# Patient Record
Sex: FEMALE | Race: WHITE | NOT HISPANIC OR LATINO | Employment: OTHER | URBAN - METROPOLITAN AREA
[De-identification: names, ages, dates, MRNs, and addresses within clinical notes are randomized per-mention and may not be internally consistent; named-entity substitution may affect disease eponyms.]

---

## 2020-07-06 ENCOUNTER — OFFICE VISIT (OUTPATIENT)
Dept: URBAN - METROPOLITAN AREA CLINIC 68 | Facility: CLINIC | Age: 85
End: 2020-07-06

## 2020-07-07 ENCOUNTER — OFFICE VISIT (OUTPATIENT)
Dept: URBAN - METROPOLITAN AREA CLINIC 68 | Facility: CLINIC | Age: 85
End: 2020-07-07

## 2020-07-16 ENCOUNTER — OFFICE VISIT (OUTPATIENT)
Dept: URBAN - METROPOLITAN AREA SURGERY CENTER 12 | Facility: SURGERY CENTER | Age: 85
End: 2020-07-16

## 2020-07-17 ENCOUNTER — LAB OUTSIDE AN ENCOUNTER (OUTPATIENT)
Dept: URBAN - METROPOLITAN AREA CLINIC 68 | Facility: CLINIC | Age: 85
End: 2020-07-17

## 2020-07-17 LAB — 01: (no result)

## 2020-07-29 ENCOUNTER — TELEPHONE ENCOUNTER (OUTPATIENT)
Dept: URBAN - METROPOLITAN AREA CLINIC 68 | Facility: CLINIC | Age: 85
End: 2020-07-29

## 2020-07-29 ENCOUNTER — OFFICE VISIT (OUTPATIENT)
Dept: URBAN - METROPOLITAN AREA CLINIC 68 | Facility: CLINIC | Age: 85
End: 2020-07-29

## 2020-08-18 ENCOUNTER — TELEPHONE ENCOUNTER (OUTPATIENT)
Dept: URBAN - METROPOLITAN AREA CLINIC 68 | Facility: CLINIC | Age: 85
End: 2020-08-18

## 2020-08-19 ENCOUNTER — OFFICE VISIT (OUTPATIENT)
Dept: URBAN - METROPOLITAN AREA SURGERY CENTER 12 | Facility: SURGERY CENTER | Age: 85
End: 2020-08-19

## 2020-10-02 ENCOUNTER — OFFICE VISIT (OUTPATIENT)
Dept: URBAN - METROPOLITAN AREA CLINIC 68 | Facility: CLINIC | Age: 85
End: 2020-10-02

## 2021-01-15 ENCOUNTER — OFFICE VISIT (OUTPATIENT)
Dept: URBAN - METROPOLITAN AREA CLINIC 68 | Facility: CLINIC | Age: 86
End: 2021-01-15

## 2021-02-16 ENCOUNTER — OFFICE VISIT (OUTPATIENT)
Dept: URBAN - METROPOLITAN AREA CLINIC 68 | Facility: CLINIC | Age: 86
End: 2021-02-16

## 2021-05-27 ENCOUNTER — OFFICE VISIT (OUTPATIENT)
Dept: URBAN - METROPOLITAN AREA CLINIC 68 | Facility: CLINIC | Age: 86
End: 2021-05-27

## 2021-07-22 ENCOUNTER — OFFICE VISIT (OUTPATIENT)
Dept: URBAN - METROPOLITAN AREA CLINIC 68 | Facility: CLINIC | Age: 86
End: 2021-07-22

## 2021-08-17 ENCOUNTER — OFFICE VISIT (OUTPATIENT)
Dept: URBAN - METROPOLITAN AREA CLINIC 68 | Facility: CLINIC | Age: 86
End: 2021-08-17

## 2022-01-14 ENCOUNTER — OFFICE VISIT (OUTPATIENT)
Dept: URBAN - METROPOLITAN AREA CLINIC 68 | Facility: CLINIC | Age: 87
End: 2022-01-14

## 2022-01-19 ENCOUNTER — OFFICE VISIT (OUTPATIENT)
Dept: URBAN - METROPOLITAN AREA SURGERY CENTER 12 | Facility: SURGERY CENTER | Age: 87
End: 2022-01-19

## 2022-01-20 ENCOUNTER — LAB OUTSIDE AN ENCOUNTER (OUTPATIENT)
Dept: URBAN - METROPOLITAN AREA CLINIC 68 | Facility: CLINIC | Age: 87
End: 2022-01-20

## 2022-01-20 LAB — 01: (no result)

## 2022-04-20 ENCOUNTER — OFFICE VISIT (OUTPATIENT)
Dept: URBAN - METROPOLITAN AREA CLINIC 68 | Facility: CLINIC | Age: 87
End: 2022-04-20

## 2022-05-05 ENCOUNTER — OFFICE VISIT (OUTPATIENT)
Dept: URBAN - METROPOLITAN AREA CLINIC 68 | Facility: CLINIC | Age: 87
End: 2022-05-05

## 2022-05-19 ENCOUNTER — OFFICE VISIT (OUTPATIENT)
Dept: URBAN - METROPOLITAN AREA CLINIC 68 | Facility: CLINIC | Age: 87
End: 2022-05-19

## 2022-06-04 ENCOUNTER — TELEPHONE ENCOUNTER (OUTPATIENT)
Dept: URBAN - METROPOLITAN AREA CLINIC 68 | Facility: CLINIC | Age: 87
End: 2022-06-04

## 2022-06-04 RX ORDER — FAMOTIDINE 40 MG/1
FAMOTIDINE( 40MG ORAL  DAILY ) INACTIVE -HX ENTRY TABLET ORAL DAILY
OUTPATIENT
Start: 2020-07-07

## 2022-06-04 RX ORDER — POLYETHYLENE GLYCOL 3350 17 G/DOSE
POWDER (GRAM) ORAL
Qty: 1 | Refills: 0 | OUTPATIENT
Start: 2018-01-03 | End: 2018-02-02

## 2022-06-04 RX ORDER — PRAVASTATIN SODIUM 40 MG/1
PRAVASTATIN SODIUM( 40MG ORAL  DAILY ) INACTIVE -HX ENTRY TABLET ORAL DAILY
OUTPATIENT
Start: 2020-07-07

## 2022-06-04 RX ORDER — PANTOPRAZOLE SODIUM 40 MG/1
TABLET, DELAYED RELEASE ORAL DAILY
Qty: 90 | Refills: 0 | OUTPATIENT
Start: 2020-07-29 | End: 2020-10-27

## 2022-06-04 RX ORDER — AMLODIPINE BESYLATE 5 MG/1
AMLODIPINE BESYLATE( 5MG ORAL  DAILY ) INACTIVE -HX ENTRY TABLET ORAL DAILY
OUTPATIENT
Start: 2020-07-07

## 2022-06-04 RX ORDER — CLOPIDOGREL 75 MG/1
CLOPIDOGREL BISULFATE( 75MG ORAL  DAILY ) INACTIVE -HX ENTRY TABLET ORAL DAILY
OUTPATIENT
Start: 2020-07-07

## 2022-06-05 ENCOUNTER — TELEPHONE ENCOUNTER (OUTPATIENT)
Dept: URBAN - METROPOLITAN AREA CLINIC 68 | Facility: CLINIC | Age: 87
End: 2022-06-05

## 2022-06-05 RX ORDER — CHLORHEXIDINE GLUCONATE 4 %
MULTIVITAMIN ADULTS(  ORAL  DAILY ) ACTIVE -HX ENTRY LIQUID (ML) TOPICAL DAILY
Status: ACTIVE | COMMUNITY
Start: 2022-01-14

## 2022-06-05 RX ORDER — ATORVASTATIN CALCIUM 10 MG/1
ATORVASTATIN CALCIUM( 10MG ORAL 1 DAILY ) ACTIVE -HX ENTRY TABLET, FILM COATED ORAL DAILY
Status: ACTIVE | COMMUNITY
Start: 2022-01-14

## 2022-06-05 RX ORDER — CARVEDILOL 25 MG/1
CARVEDILOL( 25MG ORAL  DAILY ) ACTIVE -HX ENTRY TABLET, FILM COATED ORAL DAILY
Status: ACTIVE | COMMUNITY
Start: 2022-01-14

## 2022-06-05 RX ORDER — LISINOPRIL 20 MG/1
LISINOPRIL( 20MG ORAL  DAILY ) ACTIVE -HX ENTRY TABLET ORAL DAILY
Status: ACTIVE | COMMUNITY
Start: 2022-01-14

## 2022-06-05 RX ORDER — HYDROCORTISONE ACETATE 0.5 %
GLUCOSAMINE CHONDR 1500 COMPLX(  ORAL  DAILY ) ACTIVE -HX ENTRY CREAM (GRAM) TOPICAL DAILY
Status: ACTIVE | COMMUNITY
Start: 2022-01-14

## 2022-06-05 RX ORDER — ALENDRONATE SODIUM 70 MG/1
ALENDRONATE SODIUM( 70MG ORAL  WEEKLY ) ACTIVE -HX ENTRY TABLET ORAL WEEKLY
Status: ACTIVE | COMMUNITY
Start: 2022-01-14

## 2022-06-25 ENCOUNTER — TELEPHONE ENCOUNTER (OUTPATIENT)
Age: 87
End: 2022-06-25

## 2022-06-25 RX ORDER — LORATADINE 5 MG
TABLET,CHEWABLE ORAL
Qty: 1 | Refills: 0 | OUTPATIENT
Start: 2018-01-03 | End: 2018-02-02

## 2022-06-25 RX ORDER — AMLODIPINE BESYLATE 5 MG/1
AMLODIPINE BESYLATE( 5MG ORAL  DAILY ) INACTIVE -HX ENTRY TABLET ORAL DAILY
OUTPATIENT
Start: 2020-07-07

## 2022-06-25 RX ORDER — PANTOPRAZOLE 40 MG/1
TABLET, DELAYED RELEASE ORAL DAILY
Qty: 90 | Refills: 0 | OUTPATIENT
Start: 2020-07-29 | End: 2020-10-27

## 2022-06-25 RX ORDER — CLOPIDOGREL BISULFATE 75 MG/1
CLOPIDOGREL BISULFATE( 75MG ORAL  DAILY ) INACTIVE -HX ENTRY TABLET, FILM COATED ORAL DAILY
OUTPATIENT
Start: 2020-07-07

## 2022-06-25 RX ORDER — FAMOTIDINE 40 MG/1
FAMOTIDINE( 40MG ORAL  DAILY ) INACTIVE -HX ENTRY TABLET, FILM COATED ORAL DAILY
OUTPATIENT
Start: 2020-07-07

## 2022-06-25 RX ORDER — PRAVASTATIN SODIUM 40 MG/1
PRAVASTATIN SODIUM( 40MG ORAL  DAILY ) INACTIVE -HX ENTRY TABLET ORAL DAILY
OUTPATIENT
Start: 2020-07-07

## 2022-06-26 ENCOUNTER — TELEPHONE ENCOUNTER (OUTPATIENT)
Age: 87
End: 2022-06-26

## 2022-06-26 RX ORDER — LISINOPRIL 20 MG/1
LISINOPRIL( 20MG ORAL  DAILY ) ACTIVE -HX ENTRY TABLET ORAL DAILY
Status: ACTIVE | COMMUNITY
Start: 2022-05-19

## 2022-06-26 RX ORDER — CHROMIUM 200 MCG
VIT D3(    DAILY ) ACTIVE -HX ENTRY TABLET ORAL DAILY
Status: ACTIVE | COMMUNITY
Start: 2022-05-19

## 2022-06-26 RX ORDER — CALCIUM CARBONATE/VITAMIN D3 600 MG-10
CALCIUM (    DAILY ) ACTIVE -HX ENTRY TABLET ORAL DAILY
Status: ACTIVE | COMMUNITY
Start: 2022-05-19

## 2022-06-26 RX ORDER — ALENDRONATE SODIUM 70 MG/1
ALENDRONATE SODIUM( 70MG ORAL  WEEKLY ) ACTIVE -HX ENTRY TABLET ORAL WEEKLY
Status: ACTIVE | COMMUNITY
Start: 2022-05-19

## 2022-06-26 RX ORDER — ATORVASTATIN CALCIUM 10 MG/1
ATORVASTATIN CALCIUM( 10MG ORAL 1 DAILY ) ACTIVE -HX ENTRY TABLET, FILM COATED ORAL DAILY
Status: ACTIVE | COMMUNITY
Start: 2022-05-19

## 2022-06-26 RX ORDER — OMEGA-3/DHA/EPA/FISH OIL 1000 MG
FISH OIL( 1000MG ORAL  DAILY ) ACTIVE -HX ENTRY CAPSULE ORAL DAILY
Status: ACTIVE | COMMUNITY
Start: 2022-05-19

## 2022-06-26 RX ORDER — CARVEDILOL 25 MG/1
CARVEDILOL( 25MG ORAL  DAILY ) ACTIVE -HX ENTRY TABLET, FILM COATED ORAL DAILY
Status: ACTIVE | COMMUNITY
Start: 2022-05-19

## 2022-06-26 RX ORDER — FLUPHENAZINE HCL 1 MG
GLUCOSAMINE CHONDR 1500 COMPLX(  ORAL  DAILY ) ACTIVE -HX ENTRY TABLET ORAL DAILY
Status: ACTIVE | COMMUNITY
Start: 2022-05-19

## 2022-06-26 RX ORDER — ASPIRIN 81 MG/1
LOW-DOSE ASPIRIN( 81MG ORAL 1 DAILY ) ACTIVE -HX ENTRY TABLET, COATED ORAL DAILY
Status: ACTIVE | COMMUNITY
Start: 2022-05-19

## 2022-06-26 RX ORDER — CHLORHEXIDINE GLUCONATE 4 %
MULTIVITAMIN ADULTS(  ORAL  DAILY ) ACTIVE -HX ENTRY LIQUID (ML) TOPICAL DAILY
Status: ACTIVE | COMMUNITY
Start: 2022-05-19

## 2022-06-30 ENCOUNTER — OFFICE VISIT (OUTPATIENT)
Dept: URBAN - METROPOLITAN AREA CLINIC 68 | Facility: CLINIC | Age: 87
End: 2022-06-30

## 2022-07-18 ENCOUNTER — OFFICE VISIT (OUTPATIENT)
Dept: URBAN - METROPOLITAN AREA CLINIC 68 | Facility: CLINIC | Age: 87
End: 2022-07-18

## 2023-08-28 ENCOUNTER — LAB OUTSIDE AN ENCOUNTER (OUTPATIENT)
Dept: URBAN - METROPOLITAN AREA CLINIC 68 | Facility: CLINIC | Age: 88
End: 2023-08-28

## 2023-08-28 ENCOUNTER — OFFICE VISIT (OUTPATIENT)
Dept: URBAN - METROPOLITAN AREA CLINIC 68 | Facility: CLINIC | Age: 88
End: 2023-08-28
Payer: MEDICARE

## 2023-08-28 ENCOUNTER — WEB ENCOUNTER (OUTPATIENT)
Dept: URBAN - METROPOLITAN AREA CLINIC 68 | Facility: CLINIC | Age: 88
End: 2023-08-28

## 2023-08-28 VITALS
HEIGHT: 66 IN | HEART RATE: 66 BPM | SYSTOLIC BLOOD PRESSURE: 122 MMHG | BODY MASS INDEX: 24.11 KG/M2 | DIASTOLIC BLOOD PRESSURE: 74 MMHG | OXYGEN SATURATION: 97 % | WEIGHT: 150 LBS

## 2023-08-28 DIAGNOSIS — K29.30 CHRONIC SUPERFICIAL GASTRITIS WITHOUT BLEEDING: ICD-10-CM

## 2023-08-28 DIAGNOSIS — K57.30 COLON, DIVERTICULOSIS: ICD-10-CM

## 2023-08-28 DIAGNOSIS — Q39.6 ESOPHAGEAL DIVERTICULUM: ICD-10-CM

## 2023-08-28 DIAGNOSIS — R13.19 ESOPHAGEAL DYSPHAGIA: ICD-10-CM

## 2023-08-28 DIAGNOSIS — K59.09 CHRONIC CONSTIPATION: ICD-10-CM

## 2023-08-28 PROBLEM — 40890009: Status: ACTIVE | Noted: 2023-08-28

## 2023-08-28 PROCEDURE — 99214 OFFICE O/P EST MOD 30 MIN: CPT | Performed by: INTERNAL MEDICINE

## 2023-08-28 RX ORDER — ATORVASTATIN CALCIUM 10 MG/1
ATORVASTATIN CALCIUM( 10MG ORAL 1 DAILY ) ACTIVE -HX ENTRY TABLET, FILM COATED ORAL DAILY
Status: ACTIVE | COMMUNITY
Start: 2022-05-19

## 2023-08-28 RX ORDER — ASPIRIN 81 MG/1
LOW-DOSE ASPIRIN( 81MG ORAL 1 DAILY ) ACTIVE -HX ENTRY TABLET, COATED ORAL DAILY
Status: ON HOLD | COMMUNITY
Start: 2022-05-19

## 2023-08-28 RX ORDER — CALCIUM CARBONATE/VITAMIN D3 600 MG-10
CALCIUM (    DAILY ) ACTIVE -HX ENTRY TABLET ORAL DAILY
Status: ACTIVE | COMMUNITY
Start: 2022-05-19

## 2023-08-28 RX ORDER — CHLORHEXIDINE GLUCONATE 4 %
MULTIVITAMIN ADULTS(  ORAL  DAILY ) ACTIVE -HX ENTRY LIQUID (ML) TOPICAL DAILY
Status: ACTIVE | COMMUNITY
Start: 2022-05-19

## 2023-08-28 RX ORDER — LISINOPRIL 20 MG/1
LISINOPRIL( 20MG ORAL  DAILY ) ACTIVE -HX ENTRY TABLET ORAL DAILY
Status: ACTIVE | COMMUNITY
Start: 2022-05-19

## 2023-08-28 RX ORDER — ALENDRONATE SODIUM 70 MG/1
ALENDRONATE SODIUM( 70MG ORAL  WEEKLY ) ACTIVE -HX ENTRY TABLET ORAL WEEKLY
Status: ACTIVE | COMMUNITY
Start: 2022-05-19

## 2023-08-28 RX ORDER — OMEGA-3/DHA/EPA/FISH OIL 1000 MG
FISH OIL( 1000MG ORAL  DAILY ) ACTIVE -HX ENTRY CAPSULE ORAL DAILY
Status: ACTIVE | COMMUNITY
Start: 2022-05-19

## 2023-08-28 RX ORDER — PANTOPRAZOLE SODIUM 40 MG/1
1 TABLET TABLET, DELAYED RELEASE ORAL ONCE A DAY
Status: ACTIVE | COMMUNITY

## 2023-08-28 RX ORDER — CARVEDILOL 25 MG/1
CARVEDILOL( 25MG ORAL  DAILY ) ACTIVE -HX ENTRY TABLET, FILM COATED ORAL DAILY
Status: ACTIVE | COMMUNITY
Start: 2022-05-19

## 2023-08-28 RX ORDER — FLUPHENAZINE HCL 1 MG
GLUCOSAMINE CHONDR 1500 COMPLX(  ORAL  DAILY ) ACTIVE -HX ENTRY TABLET ORAL DAILY
Status: ON HOLD | COMMUNITY
Start: 2022-05-19

## 2023-08-28 RX ORDER — CHROMIUM 200 MCG
VIT D3(    DAILY ) ACTIVE -HX ENTRY TABLET ORAL DAILY
Status: ON HOLD | COMMUNITY
Start: 2022-05-19

## 2023-08-28 NOTE — HPI-TODAY'S VISIT:
Patient evaluated due to dysphagia. Referred having intermittent episodes of dysphagia mostly with solids. Patient with hx of esophageal stricture and esophageal diverticulum.  Denied nausea, vomiting or diarrhea.

## 2023-08-31 ENCOUNTER — OFFICE VISIT (OUTPATIENT)
Dept: URBAN - METROPOLITAN AREA SURGERY CENTER 12 | Facility: SURGERY CENTER | Age: 88
End: 2023-08-31
Payer: MEDICARE

## 2023-08-31 ENCOUNTER — CLAIMS CREATED FROM THE CLAIM WINDOW (OUTPATIENT)
Dept: URBAN - METROPOLITAN AREA CLINIC 4 | Facility: CLINIC | Age: 88
End: 2023-08-31
Payer: MEDICARE

## 2023-08-31 ENCOUNTER — DASHBOARD ENCOUNTERS (OUTPATIENT)
Age: 88
End: 2023-08-31

## 2023-08-31 DIAGNOSIS — K22.89 OTHER SPECIFIED DISEASE OF ESOPHAGUS: ICD-10-CM

## 2023-08-31 DIAGNOSIS — K29.70 GASTRITIS WITHOUT BLEEDING, UNSPECIFIED CHRONICITY, UNSPECIFIED GASTRITIS TYPE: ICD-10-CM

## 2023-08-31 DIAGNOSIS — K22.2 ESOPHAGEAL STRICTURE: ICD-10-CM

## 2023-08-31 DIAGNOSIS — K29.70 GASTRITIS, UNSPECIFIED, WITHOUT BLEEDING: ICD-10-CM

## 2023-08-31 DIAGNOSIS — K22.2 ESOPHAGEAL STENOSIS: ICD-10-CM

## 2023-08-31 PROBLEM — 4556007: Status: ACTIVE | Noted: 2023-08-31

## 2023-08-31 PROCEDURE — 00731 ANES UPR GI NDSC PX NOS: CPT | Performed by: NURSE ANESTHETIST, CERTIFIED REGISTERED

## 2023-08-31 PROCEDURE — 43248 EGD GUIDE WIRE INSERTION: CPT | Performed by: INTERNAL MEDICINE

## 2023-08-31 PROCEDURE — 43239 EGD BIOPSY SINGLE/MULTIPLE: CPT | Performed by: INTERNAL MEDICINE

## 2023-08-31 PROCEDURE — 88312 SPECIAL STAINS GROUP 1: CPT | Performed by: PATHOLOGY

## 2023-08-31 PROCEDURE — 43248 EGD GUIDE WIRE INSERTION: CPT | Performed by: CLINIC/CENTER

## 2023-08-31 PROCEDURE — 88305 TISSUE EXAM BY PATHOLOGIST: CPT | Performed by: PATHOLOGY

## 2023-08-31 PROCEDURE — 43239 EGD BIOPSY SINGLE/MULTIPLE: CPT | Performed by: CLINIC/CENTER

## 2023-08-31 RX ORDER — CALCIUM CARBONATE/VITAMIN D3 600 MG-10
CALCIUM (    DAILY ) ACTIVE -HX ENTRY TABLET ORAL DAILY
Status: ACTIVE | COMMUNITY
Start: 2022-05-19

## 2023-08-31 RX ORDER — ALENDRONATE SODIUM 70 MG/1
ALENDRONATE SODIUM( 70MG ORAL  WEEKLY ) ACTIVE -HX ENTRY TABLET ORAL WEEKLY
Status: ACTIVE | COMMUNITY
Start: 2022-05-19

## 2023-08-31 RX ORDER — CHROMIUM 200 MCG
VIT D3(    DAILY ) ACTIVE -HX ENTRY TABLET ORAL DAILY
Status: ON HOLD | COMMUNITY
Start: 2022-05-19

## 2023-08-31 RX ORDER — ASPIRIN 81 MG/1
LOW-DOSE ASPIRIN( 81MG ORAL 1 DAILY ) ACTIVE -HX ENTRY TABLET, COATED ORAL DAILY
Status: ON HOLD | COMMUNITY
Start: 2022-05-19

## 2023-08-31 RX ORDER — LISINOPRIL 20 MG/1
LISINOPRIL( 20MG ORAL  DAILY ) ACTIVE -HX ENTRY TABLET ORAL DAILY
Status: ACTIVE | COMMUNITY
Start: 2022-05-19

## 2023-08-31 RX ORDER — FLUPHENAZINE HCL 1 MG
GLUCOSAMINE CHONDR 1500 COMPLX(  ORAL  DAILY ) ACTIVE -HX ENTRY TABLET ORAL DAILY
Status: ON HOLD | COMMUNITY
Start: 2022-05-19

## 2023-08-31 RX ORDER — ATORVASTATIN CALCIUM 10 MG/1
ATORVASTATIN CALCIUM( 10MG ORAL 1 DAILY ) ACTIVE -HX ENTRY TABLET, FILM COATED ORAL DAILY
Status: ACTIVE | COMMUNITY
Start: 2022-05-19

## 2023-08-31 RX ORDER — CHLORHEXIDINE GLUCONATE 4 %
MULTIVITAMIN ADULTS(  ORAL  DAILY ) ACTIVE -HX ENTRY LIQUID (ML) TOPICAL DAILY
Status: ACTIVE | COMMUNITY
Start: 2022-05-19

## 2023-08-31 RX ORDER — OMEGA-3/DHA/EPA/FISH OIL 1000 MG
FISH OIL( 1000MG ORAL  DAILY ) ACTIVE -HX ENTRY CAPSULE ORAL DAILY
Status: ACTIVE | COMMUNITY
Start: 2022-05-19

## 2023-08-31 RX ORDER — CARVEDILOL 25 MG/1
CARVEDILOL( 25MG ORAL  DAILY ) ACTIVE -HX ENTRY TABLET, FILM COATED ORAL DAILY
Status: ACTIVE | COMMUNITY
Start: 2022-05-19

## 2023-08-31 RX ORDER — PANTOPRAZOLE SODIUM 40 MG/1
1 TABLET TABLET, DELAYED RELEASE ORAL ONCE A DAY
Status: ACTIVE | COMMUNITY

## 2025-05-07 ENCOUNTER — OFFICE VISIT (OUTPATIENT)
Dept: URBAN - METROPOLITAN AREA CLINIC 68 | Facility: CLINIC | Age: 89
End: 2025-05-07
Payer: MEDICARE

## 2025-05-07 ENCOUNTER — LAB OUTSIDE AN ENCOUNTER (OUTPATIENT)
Dept: URBAN - METROPOLITAN AREA CLINIC 68 | Facility: CLINIC | Age: 89
End: 2025-05-07

## 2025-05-07 DIAGNOSIS — R13.19 ESOPHAGEAL DYSPHAGIA: ICD-10-CM

## 2025-05-07 DIAGNOSIS — K29.30 CHRONIC SUPERFICIAL GASTRITIS WITHOUT BLEEDING: ICD-10-CM

## 2025-05-07 DIAGNOSIS — K62.5 RECTAL BLEED: ICD-10-CM

## 2025-05-07 DIAGNOSIS — K59.09 CHRONIC CONSTIPATION: ICD-10-CM

## 2025-05-07 DIAGNOSIS — Z12.11 SCREENING FOR COLON CANCER: ICD-10-CM

## 2025-05-07 PROBLEM — 12063002: Status: ACTIVE | Noted: 2025-05-07

## 2025-05-07 PROCEDURE — 99214 OFFICE O/P EST MOD 30 MIN: CPT | Performed by: INTERNAL MEDICINE

## 2025-05-07 RX ORDER — CHROMIUM 200 MCG
VIT D3(    DAILY ) ACTIVE -HX ENTRY TABLET ORAL DAILY
Status: ON HOLD | COMMUNITY
Start: 2022-05-19

## 2025-05-07 RX ORDER — NAPROXEN SODIUM 220 MG
ALEVE( 220MG ORAL  DAILY ) ACTIVE -HX ENTRY CAPSULE ORAL DAILY
Status: ACTIVE | COMMUNITY
Start: 2022-05-19

## 2025-05-07 RX ORDER — ASPIRIN 81 MG/1
LOW-DOSE ASPIRIN( 81MG ORAL 1 DAILY ) ACTIVE -HX ENTRY TABLET, COATED ORAL DAILY
Status: ON HOLD | COMMUNITY
Start: 2022-05-19

## 2025-05-07 RX ORDER — OMEGA-3/DHA/EPA/FISH OIL 1000 MG
FISH OIL( 1000MG ORAL  DAILY ) ACTIVE -HX ENTRY CAPSULE ORAL DAILY
Status: ACTIVE | COMMUNITY
Start: 2022-05-19

## 2025-05-07 RX ORDER — LISINOPRIL 20 MG/1
LISINOPRIL( 20MG ORAL  DAILY ) ACTIVE -HX ENTRY TABLET ORAL DAILY
Status: ACTIVE | COMMUNITY
Start: 2022-05-19

## 2025-05-07 RX ORDER — ATORVASTATIN CALCIUM 10 MG/1
ATORVASTATIN CALCIUM( 10MG ORAL 1 DAILY ) ACTIVE -HX ENTRY TABLET, FILM COATED ORAL DAILY
Status: ACTIVE | COMMUNITY
Start: 2022-05-19

## 2025-05-07 RX ORDER — HYDROCORTISONE ACETATE 0.5 %
GLUCOSAMINE CHONDR 1500 COMPLX(  ORAL  DAILY ) ACTIVE -HX ENTRY CREAM (GRAM) TOPICAL DAILY
Status: ON HOLD | COMMUNITY
Start: 2022-05-19

## 2025-05-07 RX ORDER — CARVEDILOL 25 MG/1
CARVEDILOL( 25MG ORAL  DAILY ) ACTIVE -HX ENTRY TABLET, FILM COATED ORAL DAILY
Status: ACTIVE | COMMUNITY
Start: 2022-05-19

## 2025-05-07 RX ORDER — CHLORHEXIDINE GLUCONATE 4 %
MULTIVITAMIN ADULTS(  ORAL  DAILY ) ACTIVE -HX ENTRY LIQUID (ML) TOPICAL DAILY
Status: ACTIVE | COMMUNITY
Start: 2022-05-19

## 2025-05-07 RX ORDER — PANTOPRAZOLE SODIUM 40 MG/1
1 TABLET TABLET, DELAYED RELEASE ORAL ONCE A DAY
Status: ACTIVE | COMMUNITY

## 2025-05-07 RX ORDER — ALENDRONATE SODIUM 70 MG/1
ALENDRONATE SODIUM( 70MG ORAL  WEEKLY ) ACTIVE -HX ENTRY TABLET ORAL WEEKLY
Status: ON HOLD | COMMUNITY
Start: 2022-05-19

## 2025-05-07 RX ORDER — CALCIUM CARBONATE/VITAMIN D3 600 MG-10
CALCIUM (    DAILY ) ACTIVE -HX ENTRY TABLET ORAL DAILY
Status: ACTIVE | COMMUNITY
Start: 2022-05-19

## 2025-05-07 NOTE — HPI-TODAY'S VISIT:
Patient evaluated due to dysphagia. Referred having intermittent episodes of dysphagia with solids. Had previous esophageal dilation. Referred having intermittent episodes of painless rectal bleeding.  Denies nausea, vomits,, odynophagia, heartburn, abdominal pain, diarrhea, constipation  or weight loss

## 2025-05-28 ENCOUNTER — CLAIMS CREATED FROM THE CLAIM WINDOW (OUTPATIENT)
Dept: URBAN - METROPOLITAN AREA SURGERY CENTER 12 | Facility: SURGERY CENTER | Age: 89
End: 2025-05-28
Payer: MEDICARE

## 2025-05-28 DIAGNOSIS — K22.5 DIVERTICULUM OF ESOPHAGUS, ACQUIRED: ICD-10-CM

## 2025-05-28 DIAGNOSIS — K64.1 SECOND DEGREE HEMORRHOIDS: ICD-10-CM

## 2025-05-28 DIAGNOSIS — K29.70 GASTRITIS WITHOUT BLEEDING, UNSPECIFIED CHRONICITY, UNSPECIFIED GASTRITIS TYPE: ICD-10-CM

## 2025-05-28 DIAGNOSIS — K22.2 ESOPHAGEAL OBSTRUCTION: ICD-10-CM

## 2025-05-28 DIAGNOSIS — K62.5 HEMORRHAGE OF ANUS AND RECTUM: ICD-10-CM

## 2025-05-28 PROCEDURE — 43239 EGD BIOPSY SINGLE/MULTIPLE: CPT | Performed by: CLINIC/CENTER

## 2025-05-28 PROCEDURE — 43248 EGD GUIDE WIRE INSERTION: CPT | Performed by: CLINIC/CENTER

## 2025-05-28 PROCEDURE — 45331 SIGMOIDOSCOPY AND BIOPSY: CPT | Performed by: CLINIC/CENTER

## 2025-05-28 PROCEDURE — 45331 SIGMOIDOSCOPY AND BIOPSY: CPT | Performed by: INTERNAL MEDICINE

## 2025-05-28 PROCEDURE — 43239 EGD BIOPSY SINGLE/MULTIPLE: CPT | Performed by: INTERNAL MEDICINE

## 2025-05-28 PROCEDURE — 43248 EGD GUIDE WIRE INSERTION: CPT | Performed by: INTERNAL MEDICINE

## 2025-05-28 RX ORDER — NAPROXEN SODIUM 220 MG
ALEVE( 220MG ORAL  DAILY ) ACTIVE -HX ENTRY CAPSULE ORAL DAILY
Status: ACTIVE | COMMUNITY
Start: 2022-05-19

## 2025-05-28 RX ORDER — ATORVASTATIN CALCIUM 10 MG/1
ATORVASTATIN CALCIUM( 10MG ORAL 1 DAILY ) ACTIVE -HX ENTRY TABLET, FILM COATED ORAL DAILY
Status: ACTIVE | COMMUNITY
Start: 2022-05-19

## 2025-05-28 RX ORDER — PANTOPRAZOLE SODIUM 40 MG/1
1 TABLET TABLET, DELAYED RELEASE ORAL ONCE A DAY
Status: ACTIVE | COMMUNITY

## 2025-05-28 RX ORDER — HYDROCORTISONE ACETATE 0.5 %
GLUCOSAMINE CHONDR 1500 COMPLX(  ORAL  DAILY ) ACTIVE -HX ENTRY CREAM (GRAM) TOPICAL DAILY
Status: ON HOLD | COMMUNITY
Start: 2022-05-19

## 2025-05-28 RX ORDER — CHLORHEXIDINE GLUCONATE 4 %
MULTIVITAMIN ADULTS(  ORAL  DAILY ) ACTIVE -HX ENTRY LIQUID (ML) TOPICAL DAILY
Status: ACTIVE | COMMUNITY
Start: 2022-05-19

## 2025-05-28 RX ORDER — ALENDRONATE SODIUM 70 MG/1
ALENDRONATE SODIUM( 70MG ORAL  WEEKLY ) ACTIVE -HX ENTRY TABLET ORAL WEEKLY
Status: ON HOLD | COMMUNITY
Start: 2022-05-19

## 2025-05-28 RX ORDER — CALCIUM CARBONATE/VITAMIN D3 600 MG-10
CALCIUM (    DAILY ) ACTIVE -HX ENTRY TABLET ORAL DAILY
Status: ACTIVE | COMMUNITY
Start: 2022-05-19

## 2025-05-28 RX ORDER — ASPIRIN 81 MG/1
LOW-DOSE ASPIRIN( 81MG ORAL 1 DAILY ) ACTIVE -HX ENTRY TABLET, COATED ORAL DAILY
Status: ON HOLD | COMMUNITY
Start: 2022-05-19

## 2025-05-28 RX ORDER — CHROMIUM 200 MCG
VIT D3(    DAILY ) ACTIVE -HX ENTRY TABLET ORAL DAILY
Status: ON HOLD | COMMUNITY
Start: 2022-05-19

## 2025-05-28 RX ORDER — CARVEDILOL 25 MG/1
CARVEDILOL( 25MG ORAL  DAILY ) ACTIVE -HX ENTRY TABLET, FILM COATED ORAL DAILY
Status: ACTIVE | COMMUNITY
Start: 2022-05-19

## 2025-05-28 RX ORDER — OMEGA-3/DHA/EPA/FISH OIL 1000 MG
FISH OIL( 1000MG ORAL  DAILY ) ACTIVE -HX ENTRY CAPSULE ORAL DAILY
Status: ACTIVE | COMMUNITY
Start: 2022-05-19

## 2025-05-28 RX ORDER — LISINOPRIL 20 MG/1
LISINOPRIL( 20MG ORAL  DAILY ) ACTIVE -HX ENTRY TABLET ORAL DAILY
Status: ACTIVE | COMMUNITY
Start: 2022-05-19

## 2025-06-18 ENCOUNTER — OFFICE VISIT (OUTPATIENT)
Dept: URBAN - METROPOLITAN AREA CLINIC 68 | Facility: CLINIC | Age: 89
End: 2025-06-18